# Patient Record
Sex: FEMALE | Race: WHITE | NOT HISPANIC OR LATINO | URBAN - METROPOLITAN AREA
[De-identification: names, ages, dates, MRNs, and addresses within clinical notes are randomized per-mention and may not be internally consistent; named-entity substitution may affect disease eponyms.]

---

## 2020-02-12 ENCOUNTER — APPOINTMENT (RX ONLY)
Dept: URBAN - METROPOLITAN AREA OTHER 6 | Facility: OTHER | Age: 85
Setting detail: DERMATOLOGY
End: 2020-02-12

## 2020-02-12 DIAGNOSIS — L82.1 OTHER SEBORRHEIC KERATOSIS: ICD-10-CM

## 2020-02-12 DIAGNOSIS — L40.0 PSORIASIS VULGARIS: ICD-10-CM

## 2020-02-12 PROCEDURE — ? PRESCRIPTION

## 2020-02-12 PROCEDURE — 99202 OFFICE O/P NEW SF 15 MIN: CPT

## 2020-02-12 PROCEDURE — ? COUNSELING

## 2020-02-12 RX ORDER — FLUOCINONIDE 0.5 MG/ML
SOLUTION TOPICAL
Qty: 1 | Refills: 1 | Status: ERX | COMMUNITY
Start: 2020-02-12

## 2020-02-12 RX ADMIN — FLUOCINONIDE: 0.5 SOLUTION TOPICAL at 00:00

## 2020-02-12 ASSESSMENT — LOCATION ZONE DERM
LOCATION ZONE: SCALP
LOCATION ZONE: TRUNK

## 2020-02-12 ASSESSMENT — LOCATION SIMPLE DESCRIPTION DERM
LOCATION SIMPLE: SCALP
LOCATION SIMPLE: POSTERIOR SCALP
LOCATION SIMPLE: ABDOMEN

## 2020-02-12 ASSESSMENT — LOCATION DETAILED DESCRIPTION DERM
LOCATION DETAILED: LEFT OCCIPITAL SCALP
LOCATION DETAILED: RIGHT SUPERIOR OCCIPITAL SCALP
LOCATION DETAILED: RIGHT SUPERIOR PARIETAL SCALP
LOCATION DETAILED: LEFT LATERAL ABDOMEN

## 2022-03-09 ENCOUNTER — DASHBOARD ENCOUNTERS (OUTPATIENT)
Age: 87
End: 2022-03-09

## 2022-03-09 ENCOUNTER — WEB ENCOUNTER (OUTPATIENT)
Dept: URBAN - METROPOLITAN AREA CLINIC 115 | Facility: CLINIC | Age: 87
End: 2022-03-09

## 2022-03-09 ENCOUNTER — LAB OUTSIDE AN ENCOUNTER (OUTPATIENT)
Dept: URBAN - METROPOLITAN AREA CLINIC 115 | Facility: CLINIC | Age: 87
End: 2022-03-09

## 2022-03-09 ENCOUNTER — OFFICE VISIT (OUTPATIENT)
Dept: URBAN - METROPOLITAN AREA CLINIC 115 | Facility: CLINIC | Age: 87
End: 2022-03-09
Payer: MEDICARE

## 2022-03-09 DIAGNOSIS — K64.1 GRADE II HEMORRHOIDS: ICD-10-CM

## 2022-03-09 DIAGNOSIS — R10.31 RLQ ABDOMINAL PAIN: ICD-10-CM

## 2022-03-09 DIAGNOSIS — K59.09 CHRONIC CONSTIPATION: ICD-10-CM

## 2022-03-09 PROBLEM — 236069009: Status: ACTIVE | Noted: 2022-03-09

## 2022-03-09 PROBLEM — 301754002 RIGHT LOWER QUADRANT PAIN: Status: ACTIVE | Noted: 2022-03-09

## 2022-03-09 PROBLEM — 721704005: Status: ACTIVE | Noted: 2022-03-09

## 2022-03-09 PROCEDURE — 99203 OFFICE O/P NEW LOW 30 MIN: CPT | Performed by: INTERNAL MEDICINE

## 2022-03-09 RX ORDER — ALPRAZOLAM 0.25 MG/1
1 TABLET TABLET ORAL TWICE A DAY
Status: ACTIVE | COMMUNITY

## 2022-03-09 RX ORDER — DILTIAZEM HYDROCHLORIDE 5 MG/ML
AS DIRECTED INJECTION INTRAVENOUS
Status: ACTIVE | COMMUNITY

## 2022-03-09 NOTE — HPI-TODAY'S VISIT:
is a 89-year-old female accompanied by her daughter came in for the evaluation of having rectal discomfort as well as constipation for years.  Patient stated she has had a colonoscopy in her 70s was told to be unremarkable.  Denies any family history of colon cancer.  Patient stated over the years she has had constipation recently this pain seems to be more worse.  She had taken Metamucil and prunes which helps her some of the symptoms.  She also noticed that she is seeing a urologist for bladder leakage and recommended her to have MiraLAX which has helped her symptoms also reports having abdominal bloating and discomfort when she is constipated.  She feels like she has to strain frequently when she had hard stools.  And her symptoms are slightly worse after she had fallen last year which led to subdural hematoma.  Patient denies any diarrhea.  Patient reports having some weight loss which is progressive over the years.  And her daughter endorsed her history.  Patient denies having abdominal pain that is any severe however she reports more of a bloating abdominal discomfort which improves after having a bowel movement.

## 2022-03-09 NOTE — PHYSICAL EXAM GASTROINTESTINAL
Abdomen , soft, nontender, nondistended , no guarding or rigidity , no masses palpable , normal bowel sounds , Liver and Spleen , no hepatomegaly present , no hepatosplenomegaly , liver nontender , spleen not palpable , Rectal ,poor sphincter tone , externa and  internal hemorrhoids,  large amount of stool burden , No rectal masses or bleeding present

## 2022-03-09 NOTE — PHYSICAL EXAM CONSTITUTIONAL:
well developed, well nourished , in no acute distress , ambulating without difficulty, uses cane  , normal communication ability

## 2024-09-26 ENCOUNTER — P2P PATIENT RECORD (OUTPATIENT)
Age: 89
End: 2024-09-26

## 2024-10-18 ENCOUNTER — OFFICE VISIT (OUTPATIENT)
Dept: URBAN - METROPOLITAN AREA CLINIC 115 | Facility: CLINIC | Age: 89
End: 2024-10-18

## 2025-01-13 ENCOUNTER — OFFICE VISIT (OUTPATIENT)
Dept: URBAN - METROPOLITAN AREA CLINIC 115 | Facility: CLINIC | Age: OVER 89
End: 2025-01-13

## 2025-01-13 RX ORDER — DILTIAZEM HYDROCHLORIDE 5 MG/ML
AS DIRECTED INJECTION INTRAVENOUS
Status: ACTIVE | COMMUNITY

## 2025-01-13 RX ORDER — ALPRAZOLAM 0.25 MG/1
1 TABLET TABLET ORAL TWICE A DAY
Status: ACTIVE | COMMUNITY

## 2025-01-13 NOTE — HPI-TODAY'S VISIT:
89 y/o F with PMH of  pt of Dr. Peña presents with c/o chronic diarrhea Last seen in 2022 with constipation; was advised high fiber and hydration, miralax/colace, etc. Denies abdominal pain, n/v, constipation, blood in stool/vomit, dysphagia, odynophagia, unintentional weight loss, change in appetite, early satiety. Denies NSAID use, EtOH/tobacco/marijuana use.  Last colonoscopy:

## 2025-02-19 ENCOUNTER — OFFICE VISIT (OUTPATIENT)
Dept: URBAN - METROPOLITAN AREA CLINIC 115 | Facility: CLINIC | Age: OVER 89
End: 2025-02-19
Payer: MEDICARE

## 2025-02-19 ENCOUNTER — LAB OUTSIDE AN ENCOUNTER (OUTPATIENT)
Dept: URBAN - METROPOLITAN AREA CLINIC 115 | Facility: CLINIC | Age: OVER 89
End: 2025-02-19

## 2025-02-19 VITALS
WEIGHT: 137 LBS | HEIGHT: 67 IN | TEMPERATURE: 98.2 F | DIASTOLIC BLOOD PRESSURE: 72 MMHG | HEART RATE: 71 BPM | BODY MASS INDEX: 21.5 KG/M2 | SYSTOLIC BLOOD PRESSURE: 113 MMHG

## 2025-02-19 DIAGNOSIS — R10.33 PERIUMBILICAL PAIN: ICD-10-CM

## 2025-02-19 DIAGNOSIS — R12 HEARTBURN: ICD-10-CM

## 2025-02-19 DIAGNOSIS — R63.4 WEIGHT LOSS, UNINTENTIONAL: ICD-10-CM

## 2025-02-19 DIAGNOSIS — R68.81 EARLY SATIETY: ICD-10-CM

## 2025-02-19 DIAGNOSIS — R19.7 INTERMITTENT DIARRHEA: ICD-10-CM

## 2025-02-19 DIAGNOSIS — R11.0 NAUSEA: ICD-10-CM

## 2025-02-19 DIAGNOSIS — K59.09 CHRONIC CONSTIPATION: ICD-10-CM

## 2025-02-19 PROCEDURE — 99214 OFFICE O/P EST MOD 30 MIN: CPT

## 2025-02-19 PROCEDURE — 99204 OFFICE O/P NEW MOD 45 MIN: CPT

## 2025-02-19 RX ORDER — METOPROLOL SUCCINATE 25 MG/1
TAKE 1 TABLET BY MOUTH IN THE MORNING TABLET, FILM COATED, EXTENDED RELEASE ORAL
Qty: 90 EACH | Refills: 1 | Status: ACTIVE | COMMUNITY

## 2025-02-19 RX ORDER — ALPRAZOLAM 0.5 MG/1
TAKE 1 TABLET BY MOUTH TWICE DAILY AS NEEDED FOR CHRONIC ANXIETY. USE SPARINGLY TABLET ORAL
Qty: 60 EACH | Refills: 1 | Status: ACTIVE | COMMUNITY

## 2025-02-19 RX ORDER — FUROSEMIDE 40 MG/1
TABLET ORAL
Qty: 30 EACH | Refills: 0 | Status: ACTIVE | COMMUNITY

## 2025-02-19 RX ORDER — ALPRAZOLAM 0.25 MG/1
1 TABLET TABLET ORAL TWICE A DAY
Status: ACTIVE | COMMUNITY

## 2025-02-19 RX ORDER — LISINOPRIL 30 MG/1
TAKE 1 TABLET BY MOUTH ONCE DAILY TABLET ORAL
Qty: 90 EACH | Refills: 3 | Status: ACTIVE | COMMUNITY

## 2025-02-19 RX ORDER — DILTIAZEM HYDROCHLORIDE 5 MG/ML
AS DIRECTED INJECTION INTRAVENOUS
Status: ACTIVE | COMMUNITY

## 2025-02-19 NOTE — PHYSICAL EXAM GASTROINTESTINAL
Abdomen , mildly firm in lower abdomen, nontender, nondistended , no guarding or rigidity , no masses palpable

## 2025-02-19 NOTE — HPI-TODAY'S VISIT:
89 y/o F with PMH of hiatal hernia, HTN, CHF, on Farxiga presents with c/o abd pain, diarrhea, nausea for mos. Here today with daughter who is helping provide history  Reports belching > flatulence, chest pain, intermittent nausea, periumb abd cramps. Barley water seems to help. Takes protein shakes that are plant based which also seems to help to avoid milk. Does note some heartburn. BMs are qd, intermittent diarrhea with more of a history of constipation; mostly type 4 with occ 1. With straining, she may have blood d/t her hemorrhoid. States she never had a good appetite but now worse, lost 15 lbs in the past 1-2 yrs since starting Lasix. Early satiety. States sometimes she feels like she cannot eat anymore because she feels full to her throat. Denies odynophagia. Denies NSAID use. Denies EtOH/tobacco/marijuana use. Last seen in 2022 for constipation and hemorrhoids with Dr. Peña; was advised inc water/fiber intake, miralax/fleet suppositories.

## 2025-03-07 ENCOUNTER — TELEPHONE ENCOUNTER (OUTPATIENT)
Dept: URBAN - METROPOLITAN AREA CLINIC 115 | Facility: CLINIC | Age: OVER 89
End: 2025-03-07

## 2025-03-10 ENCOUNTER — LAB OUTSIDE AN ENCOUNTER (OUTPATIENT)
Dept: URBAN - METROPOLITAN AREA CLINIC 115 | Facility: CLINIC | Age: OVER 89
End: 2025-03-10

## 2025-03-26 ENCOUNTER — OFFICE VISIT (OUTPATIENT)
Dept: URBAN - METROPOLITAN AREA CLINIC 115 | Facility: CLINIC | Age: OVER 89
End: 2025-03-26

## 2025-03-26 RX ORDER — ALPRAZOLAM 0.25 MG/1
1 TABLET TABLET ORAL TWICE A DAY
Status: ACTIVE | COMMUNITY

## 2025-03-26 RX ORDER — ALPRAZOLAM 0.5 MG/1
TAKE 1 TABLET BY MOUTH TWICE DAILY AS NEEDED FOR CHRONIC ANXIETY. USE SPARINGLY TABLET ORAL
Qty: 60 EACH | Refills: 1 | Status: ACTIVE | COMMUNITY

## 2025-03-26 RX ORDER — DILTIAZEM HYDROCHLORIDE 5 MG/ML
AS DIRECTED INJECTION INTRAVENOUS
Status: ACTIVE | COMMUNITY

## 2025-03-26 RX ORDER — FUROSEMIDE 40 MG/1
TABLET ORAL
Qty: 30 EACH | Refills: 0 | Status: ACTIVE | COMMUNITY

## 2025-03-26 RX ORDER — LISINOPRIL 30 MG/1
TAKE 1 TABLET BY MOUTH ONCE DAILY TABLET ORAL
Qty: 90 EACH | Refills: 3 | Status: ACTIVE | COMMUNITY

## 2025-03-26 RX ORDER — METOPROLOL SUCCINATE 25 MG/1
TAKE 1 TABLET BY MOUTH IN THE MORNING TABLET, FILM COATED, EXTENDED RELEASE ORAL
Qty: 90 EACH | Refills: 1 | Status: ACTIVE | COMMUNITY

## 2025-03-26 NOTE — HPI-TODAY'S VISIT:
89 y/o F with PMH of hiatal hernia, HTN, CHF, on Farxiga presents with c/o abd pain, diarrhea, nausea for mos. Here today with daughter who is helping provide history  Reports belching > flatulence, chest pain, intermittent nausea, periumb abd cramps. Barley water seems to help. Takes protein shakes that are plant based which also seems to help to avoid milk. Does note some heartburn. BMs are qd, intermittent diarrhea with more of a history of constipation; mostly type 4 with occ 1. With straining, she may have blood d/t her hemorrhoid. States she never had a good appetite but now worse, lost 15 lbs in the past 1-2 yrs since starting Lasix. Early satiety. States sometimes she feels like she cannot eat anymore because she feels full to her throat. Denies NSAID use. Denies EtOH/tobacco/marijuana use. Last seen in 2022 for constipation and hemorrhoids with Dr. Peña; was advised inc water/fiber intake, miralax/fleet suppositories.  Has been on high fiber and avoiding dairy, BMs are . With antireflux diet changes and pepcid prn, her heartburn is . CT ?

## 2025-03-31 ENCOUNTER — LAB OUTSIDE AN ENCOUNTER (OUTPATIENT)
Dept: URBAN - METROPOLITAN AREA CLINIC 82 | Facility: CLINIC | Age: OVER 89
End: 2025-03-31

## 2025-04-02 ENCOUNTER — TELEPHONE ENCOUNTER (OUTPATIENT)
Dept: URBAN - METROPOLITAN AREA CLINIC 115 | Facility: CLINIC | Age: OVER 89
End: 2025-04-02